# Patient Record
(demographics unavailable — no encounter records)

---

## 2025-01-03 NOTE — HISTORY OF PRESENT ILLNESS
[FreeTextEntry6] : past few days has a rash in genital region scratching at it last night tried zinc and aquaphor, seemed to help a little with the itchiness  no other symptoms, no other rash

## 2025-01-03 NOTE — PHYSICAL EXAM
[NL] : moves all extremities x4, warm, well perfused x4 [de-identified] : scattered papules on labia majora spreading to inner thighs

## 2025-01-03 NOTE — DISCUSSION/SUMMARY
[FreeTextEntry1] : 2yo with rash/vulvovaginitis mupirocin and nystatin sent continue zinc cream air exposure discussed wiping front to back RTO/call for new/worsening symptoms or as needed

## 2025-04-22 NOTE — DISCUSSION/SUMMARY
[] : The components of the vaccine(s) to be administered today are listed in the plan of care. The disease(s) for which the vaccine(s) are intended to prevent and the risks have been discussed with the caretaker.  The risks are also included in the appropriate vaccination information statements which have been provided to the patient's caregiver.  The caregiver has given consent to vaccinate. [FreeTextEntry1] : Pt is here for the following injection(s): ProQuad Pt is afebrile and well. No recent fever/illness. Vaccine given  Deidre Light LPN